# Patient Record
Sex: FEMALE | Race: WHITE | Employment: FULL TIME | URBAN - METROPOLITAN AREA
[De-identification: names, ages, dates, MRNs, and addresses within clinical notes are randomized per-mention and may not be internally consistent; named-entity substitution may affect disease eponyms.]

---

## 2017-12-20 ENCOUNTER — OFFICE VISIT (OUTPATIENT)
Dept: ORTHOPEDIC SURGERY | Age: 57
End: 2017-12-20

## 2017-12-20 VITALS
BODY MASS INDEX: 27.6 KG/M2 | HEART RATE: 74 BPM | HEIGHT: 62 IN | WEIGHT: 150 LBS | SYSTOLIC BLOOD PRESSURE: 139 MMHG | DIASTOLIC BLOOD PRESSURE: 87 MMHG

## 2017-12-20 DIAGNOSIS — M79.641 RIGHT HAND PAIN: Primary | ICD-10-CM

## 2017-12-20 DIAGNOSIS — M19.049 CMC ARTHRITIS: ICD-10-CM

## 2017-12-20 PROCEDURE — 3017F COLORECTAL CA SCREEN DOC REV: CPT | Performed by: ORTHOPAEDIC SURGERY

## 2017-12-20 PROCEDURE — 20600 DRAIN/INJ JOINT/BURSA W/O US: CPT | Performed by: ORTHOPAEDIC SURGERY

## 2017-12-20 PROCEDURE — 99203 OFFICE O/P NEW LOW 30 MIN: CPT | Performed by: ORTHOPAEDIC SURGERY

## 2017-12-20 PROCEDURE — 1036F TOBACCO NON-USER: CPT | Performed by: ORTHOPAEDIC SURGERY

## 2017-12-20 PROCEDURE — G8419 CALC BMI OUT NRM PARAM NOF/U: HCPCS | Performed by: ORTHOPAEDIC SURGERY

## 2017-12-20 PROCEDURE — G8484 FLU IMMUNIZE NO ADMIN: HCPCS | Performed by: ORTHOPAEDIC SURGERY

## 2017-12-20 PROCEDURE — L3918 METACARP FX ORTHOSIS PRE OTS: HCPCS | Performed by: ORTHOPAEDIC SURGERY

## 2017-12-20 PROCEDURE — 3014F SCREEN MAMMO DOC REV: CPT | Performed by: ORTHOPAEDIC SURGERY

## 2017-12-20 PROCEDURE — G8427 DOCREV CUR MEDS BY ELIG CLIN: HCPCS | Performed by: ORTHOPAEDIC SURGERY

## 2017-12-20 RX ORDER — IBUPROFEN 800 MG/1
800 TABLET ORAL EVERY 8 HOURS PRN
Qty: 40 TABLET | Refills: 1 | Status: SHIPPED | OUTPATIENT
Start: 2017-12-20

## 2017-12-20 NOTE — PROGRESS NOTES
demonstrates normal hygiene. Mental Status:  Patient is alert and oriented to person, place and time. Skin:  No rashes or erythema    Right Hand Examination:  Inspection:  No objective swelling  Finger Range of Motion:  Full  Wrist Range of Motion:  Normal  Vascular Exam:  Radial and ulnar arterial pulses are normal.  Conrado's Test reveals patent palmar arch. Neurologic Exam: Tinel sign is negative in the supraclavicular fossa, mid brachium, cubital tunnel, pronator teres regions. It is positive in the carpal tunnel and Phalen's test is positive  Intrinsic Muscle Strength:  Normal  Extrinsic Muscle Strength: Normal  Special Tests:  Thumb ballottement and thumb grind test are positive    Left Hand Examination:  Inspection:  No objective swelling  Finger Range of Motion:  Full  Wrist Range of Motion:  Normal  Vascular Exam:  Radial and ulnar arterial pulses are normal.  Conrado's Test reveals patent palmar arch. Neurologic Exam: Tinel sign is negative in the supraclavicular fossa, mid brachium, cubital tunnel, pronator teres regions. It is positive in the carpal tunnel and Phalen's test is positive  Intrinsic Muscle Strength:  Normal  Extrinsic Muscle Strength: Normal  Special Tests:  Negative thumb ballottement and thumb grind    Neck Exam:  There is no paraspinous tenderness. Neck compression and neck tilt test are negative. No significant pain. Additional Comments:     Additional Examinations:  X-Ray Findings: PA lateral and Bernabe view x-rays of the right hand show grade 2 osteophyte arthritis 1st carpometacarpal joint   Additional Diagnostic Test Findings:    Office Procedures: After discussing the procedure with the patient I injected her right 1st carpometacarpal joint with 1 mL betamethasone 1/2 mL Xylocaine under sterile technique.     Orders Placed This Encounter   Procedures    XR HAND RIGHT (MIN 3 VIEWS)     41891     Order Specific Question:   Reason for exam:     Answer:   Hand Pain    HI ARTHROCENTESIS ASPIR&/INJ SMALL JT/BURSA W/O US    ID BETAMETHASONE ACET&SOD PHOSP    OTS CMC Restriction Brace     Patient was prescribed a [place] CMC Restriction Brace. The right thumb will require stabilization / immobilization from this semi-rigid / rigid orthosis to improve their function. The orthosis will assist in protecting the affected area, provide functional support and facilitate healing. The patient was educated and fit by a healthcare professional with expert knowledge and specialization in brace application while under the direct supervision of the physician. Verbal and written instructions for the use of and application of this item were provided. They were instructed to contact the office immediately should the brace result in increased pain, decreased sensation, increased swelling or worsening of the condition.

## 2020-08-26 ENCOUNTER — OFFICE VISIT (OUTPATIENT)
Dept: ORTHOPEDIC SURGERY | Age: 60
End: 2020-08-26
Payer: COMMERCIAL

## 2020-08-26 VITALS — BODY MASS INDEX: 27.6 KG/M2 | HEIGHT: 62 IN | WEIGHT: 150 LBS

## 2020-08-26 PROCEDURE — G8419 CALC BMI OUT NRM PARAM NOF/U: HCPCS | Performed by: PODIATRIST

## 2020-08-26 PROCEDURE — L3260 AMBULATORY SURGICAL BOOT EAC: HCPCS | Performed by: PODIATRIST

## 2020-08-26 PROCEDURE — 1036F TOBACCO NON-USER: CPT | Performed by: PODIATRIST

## 2020-08-26 PROCEDURE — 99203 OFFICE O/P NEW LOW 30 MIN: CPT | Performed by: PODIATRIST

## 2020-08-26 PROCEDURE — G8427 DOCREV CUR MEDS BY ELIG CLIN: HCPCS | Performed by: PODIATRIST

## 2020-08-26 PROCEDURE — 3017F COLORECTAL CA SCREEN DOC REV: CPT | Performed by: PODIATRIST
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
## 2020-09-09 ENCOUNTER — OFFICE VISIT (OUTPATIENT)
Dept: ORTHOPEDIC SURGERY | Age: 60
End: 2020-09-09

## 2020-09-09 VITALS — HEIGHT: 62 IN | WEIGHT: 150 LBS | BODY MASS INDEX: 27.6 KG/M2

## 2020-09-09 PROBLEM — S63.635A SPRAIN OF INTERPHALANGEAL JOINT OF LEFT RING FINGER: Status: ACTIVE | Noted: 2020-09-09

## 2020-09-09 PROBLEM — S63.681A OTHER SPRAIN OF RIGHT THUMB, INITIAL ENCOUNTER: Status: ACTIVE | Noted: 2020-09-09

## 2020-09-09 PROCEDURE — 3017F COLORECTAL CA SCREEN DOC REV: CPT | Performed by: ORTHOPAEDIC SURGERY

## 2020-09-09 PROCEDURE — G8427 DOCREV CUR MEDS BY ELIG CLIN: HCPCS | Performed by: ORTHOPAEDIC SURGERY

## 2020-09-09 PROCEDURE — 99213 OFFICE O/P EST LOW 20 MIN: CPT | Performed by: ORTHOPAEDIC SURGERY

## 2020-09-09 PROCEDURE — G8419 CALC BMI OUT NRM PARAM NOF/U: HCPCS | Performed by: ORTHOPAEDIC SURGERY

## 2020-09-09 PROCEDURE — 1036F TOBACCO NON-USER: CPT | Performed by: ORTHOPAEDIC SURGERY
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
## 2020-09-23 ENCOUNTER — OFFICE VISIT (OUTPATIENT)
Dept: ORTHOPEDIC SURGERY | Age: 60
End: 2020-09-23
Payer: COMMERCIAL

## 2020-09-23 VITALS — BODY MASS INDEX: 27.6 KG/M2 | WEIGHT: 150 LBS | HEIGHT: 62 IN

## 2020-09-23 PROCEDURE — 3017F COLORECTAL CA SCREEN DOC REV: CPT | Performed by: PODIATRIST

## 2020-09-23 PROCEDURE — G8419 CALC BMI OUT NRM PARAM NOF/U: HCPCS | Performed by: PODIATRIST

## 2020-09-23 PROCEDURE — 1036F TOBACCO NON-USER: CPT | Performed by: PODIATRIST

## 2020-09-23 PROCEDURE — 99213 OFFICE O/P EST LOW 20 MIN: CPT | Performed by: PODIATRIST

## 2020-09-23 PROCEDURE — G8427 DOCREV CUR MEDS BY ELIG CLIN: HCPCS | Performed by: PODIATRIST

## 2020-09-23 NOTE — PROGRESS NOTES
HISTORY OF PRESENT ILLNESS:  This is a followup for a fourth metatarsal fracture of the left foot. The patient is having less pain. PHYSICAL EXAM:  There is less edema and there is no ecchymosis. There is mild pain on palpation over the fracture site. Pedal pulses are palpable bilateral.  Sensation is grossly intact bilateral.    X-RAYS:  Three weightbearing views of the left foot were taken. Increased calcification is noted at the fourth metatarsal neck. There is no further displacement, shortening, or angulation of the fracture. ASSESSMENT: Fourth metatarsal Fracture, left foot    PLAN:  I reveiwed the x-rays with the patient. She will continue with the postop shoe full-time. I will see her back in 3 weeks and please take new x-rays.

## 2020-10-19 ENCOUNTER — OFFICE VISIT (OUTPATIENT)
Dept: ORTHOPEDIC SURGERY | Age: 60
End: 2020-10-19
Payer: COMMERCIAL

## 2020-10-19 VITALS — BODY MASS INDEX: 24.84 KG/M2 | WEIGHT: 135 LBS | HEIGHT: 62 IN

## 2020-10-19 PROCEDURE — G8484 FLU IMMUNIZE NO ADMIN: HCPCS | Performed by: PODIATRIST

## 2020-10-19 PROCEDURE — 1036F TOBACCO NON-USER: CPT | Performed by: PODIATRIST

## 2020-10-19 PROCEDURE — 3017F COLORECTAL CA SCREEN DOC REV: CPT | Performed by: PODIATRIST

## 2020-10-19 PROCEDURE — 99213 OFFICE O/P EST LOW 20 MIN: CPT | Performed by: PODIATRIST

## 2020-10-19 PROCEDURE — G8427 DOCREV CUR MEDS BY ELIG CLIN: HCPCS | Performed by: PODIATRIST

## 2020-10-19 PROCEDURE — G8420 CALC BMI NORM PARAMETERS: HCPCS | Performed by: PODIATRIST

## 2020-10-19 NOTE — PROGRESS NOTES
HISTORY OF PRESENT ILLNESS: 7-week check. This is a followup for a fourth metatarsal fracture of the left foot. The patient is having no pain to the foot however, she is having some pain to her left ankle. She denies any new injury. That left ankle pain began a week ago. PHYSICAL EXAM:  There is minimal edema and there is no ecchymosis to the left foot. There also is now edema, erythema or ecchymosis to the left ankle. She has very mild palpable tenderness across the anterior central aspect of the left ankle. There is no pain or crepitus with range of motion to the left ankle. There is no limitation of range of motion as well. There is minimal pain on palpation over the fracture site. Pedal pulses are palpable bilateral.  Sensation is grossly intact bilateral.    X-RAYS:  Three weightbearing views of the left foot were taken. These demonstrate good consolidation of the fourth metatarsal neck fracture. ASSESSMENT: Fourth metatarsal Fracture, left foot    PLAN:  I reveiwed the x-rays with the patient. She will try transitioning back into a regular athletic shoe. I demonstrated range of motion exercises of her left ankle. She will continue weightbearing as tolerated with normal daily activity. Still no running jumping or kicking is allowed. Anticipate that the ankle pain will resolve once getting out of the boot. I will see her back in 3 weeks. She does not need any further x-rays.

## 2020-11-04 ENCOUNTER — OFFICE VISIT (OUTPATIENT)
Dept: ORTHOPEDIC SURGERY | Age: 60
End: 2020-11-04
Payer: COMMERCIAL

## 2020-11-04 VITALS — BODY MASS INDEX: 24.84 KG/M2 | WEIGHT: 135 LBS | HEIGHT: 62 IN

## 2020-11-04 PROCEDURE — G8420 CALC BMI NORM PARAMETERS: HCPCS | Performed by: ORTHOPAEDIC SURGERY

## 2020-11-04 PROCEDURE — 3017F COLORECTAL CA SCREEN DOC REV: CPT | Performed by: ORTHOPAEDIC SURGERY

## 2020-11-04 PROCEDURE — G8427 DOCREV CUR MEDS BY ELIG CLIN: HCPCS | Performed by: ORTHOPAEDIC SURGERY

## 2020-11-04 PROCEDURE — 99213 OFFICE O/P EST LOW 20 MIN: CPT | Performed by: ORTHOPAEDIC SURGERY

## 2020-11-04 PROCEDURE — 1036F TOBACCO NON-USER: CPT | Performed by: ORTHOPAEDIC SURGERY

## 2020-11-04 PROCEDURE — G8484 FLU IMMUNIZE NO ADMIN: HCPCS | Performed by: ORTHOPAEDIC SURGERY

## 2020-11-04 NOTE — PROGRESS NOTES
be happy to see her at any point going forward, she will follow-up as symptoms dictate. All questions and concerns were addressed today. Patient is in agreement with the plan. Rebeca Bejarano MD  Hand & Upper Extremity Surgery  [facility]  A partner of [facility] ([facility])        Please note that this transcription was created using voice recognition software. Any errors are unintentional and may be due to voice recognition transcription.

## 2020-11-11 ENCOUNTER — OFFICE VISIT (OUTPATIENT)
Dept: ORTHOPEDIC SURGERY | Age: 60
End: 2020-11-11
Payer: COMMERCIAL

## 2020-11-11 VITALS — BODY MASS INDEX: 24.84 KG/M2 | WEIGHT: 135 LBS | HEIGHT: 62 IN

## 2020-11-11 PROCEDURE — 3017F COLORECTAL CA SCREEN DOC REV: CPT | Performed by: PODIATRIST

## 2020-11-11 PROCEDURE — G8427 DOCREV CUR MEDS BY ELIG CLIN: HCPCS | Performed by: PODIATRIST

## 2020-11-11 PROCEDURE — 99212 OFFICE O/P EST SF 10 MIN: CPT | Performed by: PODIATRIST

## 2020-11-11 PROCEDURE — G8420 CALC BMI NORM PARAMETERS: HCPCS | Performed by: PODIATRIST

## 2020-11-11 PROCEDURE — G8484 FLU IMMUNIZE NO ADMIN: HCPCS | Performed by: PODIATRIST

## 2020-11-11 PROCEDURE — 1036F TOBACCO NON-USER: CPT | Performed by: PODIATRIST

## 2020-11-11 NOTE — PROGRESS NOTES
HISTORY OF PRESENT ILLNESS: 10-week check. This is a followup for a fourth metatarsal fracture of the left foot. She states that she continues having pain but it is sporadic. It is mainly at the top of the forefoot. She also notices that her fourth and fifth toes \"turn purple\". PHYSICAL EXAM:  There is minimal edema and there is no erythema or ecchymosis to the left forefoot. There is mild pain on palpation over the fourth metatarsal neck fracture site. Pedal pulses are palpable bilateral.  Sensation is grossly intact bilateral.    X-RAYS: None taken      ASSESSMENT: Fourth metatarsal Fracture, left foot    PLAN: Because of the sporadic nature of the symptoms, I feel that is not related to the bone. That should be completely healed. She feels that the pain is actually improving over the last week so she will give this more time. If she continues having pain in a few weeks then she will come back to see us otherwise I will see her back as needed.